# Patient Record
Sex: MALE | Race: WHITE | NOT HISPANIC OR LATINO | URBAN - NONMETROPOLITAN AREA
[De-identification: names, ages, dates, MRNs, and addresses within clinical notes are randomized per-mention and may not be internally consistent; named-entity substitution may affect disease eponyms.]

---

## 2018-02-27 NOTE — PATIENT DISCUSSION
patient educated will limit his vision even after cataract surgery and that he should see a retinal specialist after cat surgery to get it looked at.

## 2018-03-07 NOTE — PATIENT DISCUSSION
Cystic changes. Patient aware pseudohole may increase after CME protocol. Patient understands condition could progress to a full thickness macular hole. Will continue to monitor and follow up in three months.

## 2018-03-07 NOTE — PATIENT DISCUSSION
Patient understands pseudohole may increase after CME protocol. Patient understands condition could progress to a full thickness macular hole. Will continue to monitor and follow up in three months.

## 2018-03-20 NOTE — PATIENT DISCUSSION
Pred Forte 4 times a day for 2 week, 3 times a day for 2 week, then 2 times a day for 2 week then 1 time a day for 2 week then stop.

## 2018-03-27 NOTE — PATIENT DISCUSSION
Patient advised of the right to post-operative care by the surgeon. Patient is fully informed of, and agreed to, co-management with their primary optometric physician. Post-operative care by the surgeon is not medically necessary and co-management is clinically appropriate. Patient has received itemization of fees related to cataract surgery. Transfer of care letter completed for the patient. Transfer care of Right eye to Dr. Ar Amado on 3/27/18. Patient instructed to call immediately if any new distortion, blurring, decreased vision or eye pain.

## 2018-03-27 NOTE — PATIENT DISCUSSION
1 week PO: Patient is doing well post-operatively. The importance of post-op drop compliance was emphasized. Drop scheduled reviewed with patient. Patient to call if any visual changes or concerns.  patient will re-start gtts according to schedule given.  reiterated importance to help OS heal and avoid CME including vision loss (permanent [facility] loss potential).

## 2018-06-19 NOTE — PATIENT DISCUSSION
06/19/2018 ([facility]INA)- OCT/FA/FP at the next visit. Recommended following up in 6 months for a Comp R.

## 2018-06-19 NOTE — PATIENT DISCUSSION
Minimal cystic changes noted on today's examination and diagnostic testing. Recommended following up in 6 months.

## 2018-12-18 NOTE — PATIENT DISCUSSION
12/18/2018 ([facility])- OCT/FA/FP at the next visit. Recommended following up in 6 months for a Comp R.

## 2018-12-18 NOTE — PATIENT DISCUSSION
No progression, Minimal cystic changes noted on today's examination and diagnostic testing. Recommended following up in 6 months.

## 2021-04-15 ENCOUNTER — IMPORTED ENCOUNTER (OUTPATIENT)
Dept: URBAN - NONMETROPOLITAN AREA CLINIC 1 | Facility: CLINIC | Age: 61
End: 2021-04-15

## 2021-04-15 PROBLEM — H43.812: Noted: 2021-04-15

## 2021-04-15 PROCEDURE — 99203 OFFICE O/P NEW LOW 30 MIN: CPT

## 2021-04-15 NOTE — PATIENT DISCUSSION
PVD OS-  discussed findings w/patient-  Retina flat 360 with no breaks tears or heme. -  S&S of RD/RT reviewed with pt. -  Stressed that pt should contact our office right away with any changes or increase in symptoms.-  RTC here PRN patient will see  at home; 's Notes: MRDFE 4/15/2021 OS

## 2022-04-15 ASSESSMENT — VISUAL ACUITY
OS_SC: 20/20BLURRY
OD_SC: 20/20

## 2022-04-15 ASSESSMENT — TONOMETRY
OD_IOP_MMHG: 16
OS_IOP_MMHG: 16

## 2025-04-14 NOTE — PATIENT DISCUSSION
1 week PO: Patient is doing well post-operatively. The importance of post-op drop compliance was emphasized. Drop scheduled reviewed with patient. Patient to call if any visual changes or concerns.  patient will re-start gtts according to schedule given.  reiterated importance to help OS heal and avoid CME including vision loss (permanent [facility] loss potential).
Artificial Tears: One drop to both eyes 3-4 times daily. We recommend Systane or Refresh lubricating eye drops which can be found at any pharmacy.
CME Protocol OU.
Discussed the importance of blood sugar control in the prevention of ocular complications.
Instructed to call immediately if any new distortion, blurring, decreased vision or eye pain.
Patient understands condition, prognosis and need for follow up care.
Patient understands there is an increased risk of corneal edema after cataract surgery.
Recommended artificial tears to use as directed.
The patient feels that the cataract is significantly impacting daily activities and has elected cataract surgery. The risks, benefits, and alternatives to surgery were discussed. The patient elects to proceed with surgery.
ok to proceed with IOL OD due to FD.  Dec for Sx OD made today. goal basic krishan OD.
patient educated will limit his vision even after cataract surgery and that he should see a retinal specialist after cat surgery to get it looked at.
[FreeTextEntry6] : HATTIE presents today with sore throat. He has not had a fever.